# Patient Record
Sex: MALE | URBAN - NONMETROPOLITAN AREA
[De-identification: names, ages, dates, MRNs, and addresses within clinical notes are randomized per-mention and may not be internally consistent; named-entity substitution may affect disease eponyms.]

---

## 2023-05-08 LAB — HBA1C MFR BLD HPLC: 5.4 %

## 2023-11-30 ENCOUNTER — TRANSCRIBE ORDERS (OUTPATIENT)
Dept: CARDIOLOGY CLINIC | Facility: CLINIC | Age: 73
End: 2023-11-30

## 2023-11-30 DIAGNOSIS — R97.20 ELEVATED PROSTATE SPECIFIC ANTIGEN (PSA): Primary | ICD-10-CM

## 2023-12-14 ENCOUNTER — TELEPHONE (OUTPATIENT)
Dept: UROLOGY | Facility: CLINIC | Age: 73
End: 2023-12-14

## 2023-12-14 PROBLEM — R97.20 ELEVATED PSA: Status: ACTIVE | Noted: 2023-12-14

## 2023-12-14 NOTE — TELEPHONE ENCOUNTER
Fax sent to [facility] for Dr. Cintia Luna last office note. Error. Pt new pt.

## 2023-12-14 NOTE — PROGRESS NOTES
UROLOGY PROGRESS NOTE         NAME: Jong Ness  AGE: 73 y.o. SEX: male  : 1950   MRN: [id number]    DATE: 2023  TIME: 10:55 AM    Assessment and Plan   Procedures     Impression:   1. Elevated PSA         Plan: Discussed with the patient the options including observation and repeating the PSA in 6 months, prostate biopsy by transrectal ultrasound, or MP MRI.  Patient elects for MP MRI and I agree we will set that up at [facility] and then call patient with results.  All questions were answered to his satisfaction.      Chief Complaint   No chief complaint on file.    History of Present Illness     HPI: Jong Ness is a 73 y.o. year old male who presents with elevated PSA 6.8 on 2023.  Patient's previous PSA 6.2 on 5/10/2023.  Apparently I saw this patient in the past at [facility] urology and now the patient wishes for reevaluation with me at [facility] urology.  We will try to get my old office notes.      Currently patient without any irritable or obstructive voiding complaints.  Apparently has seen me years ago at [facility] for an elevated PSA but elected observation we have been unable to get report.  We have tried several times but [facility] has not been cooperative.      The following portions of the patient's history were reviewed and updated as appropriate: allergies, current medications, past family history, past medical history, past social history, past surgical history and problem list.  No past medical history on file.  No past surgical history on file.  shoulder  Review of Systems     Const: Denies chills, fever and weight loss.  CV: Denies chest pain.  Resp: Denies SOB.  GI: Denies abdominal pain, nausea and vomiting.  : Denies symptoms other than stated above.  Musculo: Denies back pain.    Objective   There were no vitals taken for this visit.    Physical Exam  Const: Appears healthy and well developed. No signs of acute distress present.  Resp: Respirations are regular and  unlabored.   CV: Rate is regular. Rhythm is regular.  Abdomen: Abdomen is soft, nontender, and nondistended. Kidneys are not palpable.  : BPH normal external genitalia exam  Psych: Patient's attitude is cooperative. Mood is normal. Affect is normal.    Current Medications   No current outpatient medications on file.        Karl Rodriguez MD

## 2023-12-19 ENCOUNTER — OFFICE VISIT (OUTPATIENT)
Dept: UROLOGY | Facility: CLINIC | Age: 73
End: 2023-12-19
Payer: COMMERCIAL

## 2023-12-19 VITALS
HEART RATE: 73 BPM | WEIGHT: 204 LBS | RESPIRATION RATE: 20 BRPM | OXYGEN SATURATION: 97 % | HEIGHT: 71 IN | DIASTOLIC BLOOD PRESSURE: 62 MMHG | SYSTOLIC BLOOD PRESSURE: 132 MMHG | TEMPERATURE: 96.4 F | BODY MASS INDEX: 28.56 KG/M2

## 2023-12-19 DIAGNOSIS — R97.20 ELEVATED PROSTATE SPECIFIC ANTIGEN (PSA): ICD-10-CM

## 2023-12-19 DIAGNOSIS — R97.20 ELEVATED PSA: Primary | ICD-10-CM

## 2023-12-19 PROCEDURE — 99214 OFFICE O/P EST MOD 30 MIN: CPT | Performed by: UROLOGY

## 2023-12-19 PROCEDURE — 99204 OFFICE O/P NEW MOD 45 MIN: CPT | Performed by: UROLOGY

## 2023-12-19 RX ORDER — ALBUTEROL SULFATE 90 UG/1
AEROSOL, METERED RESPIRATORY (INHALATION)
COMMUNITY

## 2024-01-08 ENCOUNTER — HOSPITAL ENCOUNTER (OUTPATIENT)
Dept: MRI IMAGING | Facility: HOSPITAL | Age: 74
Discharge: HOME | End: 2024-01-08
Attending: UROLOGY
Payer: COMMERCIAL

## 2024-01-08 ENCOUNTER — TELEPHONE (OUTPATIENT)
Dept: UROLOGY | Facility: CLINIC | Age: 74
End: 2024-01-08

## 2024-01-08 DIAGNOSIS — R97.20 ELEVATED PSA: ICD-10-CM

## 2024-01-08 PROCEDURE — A9585 GADOBUTROL INJECTION: HCPCS | Performed by: UROLOGY

## 2024-01-08 PROCEDURE — 76377 3D RENDER W/INTRP POSTPROCES: CPT

## 2024-01-08 PROCEDURE — 72197 MRI PELVIS W/O & W/DYE: CPT

## 2024-01-08 PROCEDURE — G1004 CDSM NDSC: HCPCS

## 2024-01-08 RX ORDER — GADOBUTROL 604.72 MG/ML
9 INJECTION INTRAVENOUS
Status: COMPLETED | OUTPATIENT
Start: 2024-01-08 | End: 2024-01-08

## 2024-01-08 RX ADMIN — GADOBUTROL 9 ML: 604.72 INJECTION INTRAVENOUS at 15:09

## 2024-01-08 NOTE — TELEPHONE ENCOUNTER
Pt was scheduled for virtual visit with Dr. Rodriguez on 1/15. Per Dr. Suarez VV not needed. Appt canceled pt aware. Pt had MRI today.

## 2024-01-12 ENCOUNTER — TELEPHONE (OUTPATIENT)
Dept: UROLOGY | Facility: CLINIC | Age: 74
End: 2024-01-12

## 2024-01-12 NOTE — TELEPHONE ENCOUNTER
----- Message from Karl Rodriguez MD sent at 1/12/2024  2:17 PM EST -----  Left voicemail for patient to call us back regarding the results of his MP MRI.  This is important because it is a positive score.  So if we do not hear from him by Tuesday of next week remind me to call him again thank you

## 2024-01-15 ENCOUNTER — TELEPHONE (OUTPATIENT)
Dept: UROLOGY | Facility: CLINIC | Age: 74
End: 2024-01-15

## 2024-01-15 PROBLEM — R93.89 ABNORMAL X-RAY: Status: ACTIVE | Noted: 2024-01-15

## 2024-01-15 RX ORDER — NYSTATIN 100000 U/G
CREAM TOPICAL
COMMUNITY
Start: 2023-11-30 | End: 2024-01-17 | Stop reason: ALTCHOICE

## 2024-01-15 NOTE — TELEPHONE ENCOUNTER
----- Message from Karl Rodriguez MD sent at 1/15/2024  7:54 AM EST -----  I have tried to reach this patient twice to talk about his MP MRI set him up for an office visit to discuss please either this week or next week thanks

## 2024-01-15 NOTE — PROGRESS NOTES
UROLOGY PROGRESS NOTE         NAME: Jong Ness  AGE: 73 y.o. SEX: male  : 1950   MRN: [id number]    DATE: 1/15/2024  TIME: 12:10 PM    Assessment and Plan   Procedures     Impression:   1. Elevated PSA    2. Abnormal x-ray         Plan: Plan MRI fusion biopsy transrectal ultrasound.  Risk of the procedure were explained to patient including bleeding infection he agrees.  He will get Cipro orally prior to the procedure and after.  He also do an enema the morning of.  Will get this set up the next available date at [facility].    All questions were answered to the patient.      Chief Complaint   No chief complaint on file.    History of Present Illness     HPI: Jong Ness is a 73 y.o. year old male who presents with follow-up to discuss MP MRI study.    Patient was last seen by me on 2023 for elevated PSA.  His PSA was 6.8 on 2023.  At that time patient was not having any irritable or obstructive voiding complaints.    Multiparametric MRI on 2024 showed a PI-RADS score of 4 in the right posterior medial peripheral zone measuring 1.4 cm suspicious for malignancy.  Prostate gland was calculated at 66 g.    Patient is here to discuss the options including observation, or fusion biopsy.    Had a long discussion with the patient and his wife regarding the MRI results and the size of the prostate and the recommendation for fusion biopsy he agrees.              The following portions of the patient's history were reviewed and updated as appropriate: allergies, current medications, past family history, past medical history, past social history, past surgical history and problem list.  No past medical history on file.  Past Surgical History:   Procedure Laterality Date    HEMORROIDECTOMY  2013     [facility] [place]  Review of Systems     Const: Denies chills, fever and weight loss.  CV: Denies chest pain.  Resp: Denies SOB.  GI: Denies abdominal pain, nausea and vomiting.  : Denies symptoms  other than stated above.  Musculo: Denies back pain.    Objective   There were no vitals taken for this visit.    Physical Exam  Const: Appears healthy and well developed. No signs of acute distress present.  Resp: Respirations are regular and unlabored.   CV: Rate is regular. Rhythm is regular.  Abdomen: Abdomen is soft, nontender, and nondistended. Kidneys are not palpable.  : dnp  Psych: Patient's attitude is cooperative. Mood is normal. Affect is normal.    Current Medications     Current Outpatient Medications:     nystatin (MYCOSTATIN) cream, APPLY TWICE A DAY TO RASH UNTIL CLEAR, Disp: , Rfl:     albuterol (PROVENTIL HFA,VENTOLIN HFA) 90 mcg/act inhaler, Inhalation for 17 (Patient not taking: Reported on 12/19/2023), Disp: , Rfl:         Karl Rodriguez MD

## 2024-01-17 ENCOUNTER — OFFICE VISIT (OUTPATIENT)
Dept: LAB | Facility: HOSPITAL | Age: 74
End: 2024-01-17
Payer: COMMERCIAL

## 2024-01-17 ENCOUNTER — APPOINTMENT (OUTPATIENT)
Dept: LAB | Facility: HOSPITAL | Age: 74
End: 2024-01-17
Payer: COMMERCIAL

## 2024-01-17 ENCOUNTER — OFFICE VISIT (OUTPATIENT)
Dept: UROLOGY | Facility: CLINIC | Age: 74
End: 2024-01-17
Payer: COMMERCIAL

## 2024-01-17 VITALS
OXYGEN SATURATION: 96 % | DIASTOLIC BLOOD PRESSURE: 90 MMHG | HEART RATE: 108 BPM | HEIGHT: 71 IN | SYSTOLIC BLOOD PRESSURE: 144 MMHG | TEMPERATURE: 97.5 F | BODY MASS INDEX: 28.5 KG/M2 | WEIGHT: 203.6 LBS

## 2024-01-17 DIAGNOSIS — R97.20 ELEVATED PSA: ICD-10-CM

## 2024-01-17 DIAGNOSIS — R35.0 URINE FREQUENCY: Primary | ICD-10-CM

## 2024-01-17 DIAGNOSIS — Z01.89 ENCOUNTER FOR URINE TEST: ICD-10-CM

## 2024-01-17 DIAGNOSIS — Z01.818 PREOP TESTING: ICD-10-CM

## 2024-01-17 DIAGNOSIS — R93.89 ABNORMAL X-RAY: ICD-10-CM

## 2024-01-17 LAB
ANION GAP SERPL CALCULATED.3IONS-SCNC: 4 MMOL/L
ATRIAL RATE: 64 BPM
BASOPHILS # BLD AUTO: 0.04 THOUSANDS/ÂΜL (ref 0–0.1)
BASOPHILS NFR BLD AUTO: 1 % (ref 0–1)
BUN SERPL-MCNC: 21 MG/DL (ref 5–25)
CALCIUM SERPL-MCNC: 9.8 MG/DL (ref 8.4–10.2)
CHLORIDE SERPL-SCNC: 109 MMOL/L (ref 96–108)
CO2 SERPL-SCNC: 29 MMOL/L (ref 21–32)
CREAT SERPL-MCNC: 1.05 MG/DL (ref 0.6–1.3)
EOSINOPHIL # BLD AUTO: 0.1 THOUSAND/ÂΜL (ref 0–0.61)
EOSINOPHIL NFR BLD AUTO: 2 % (ref 0–6)
ERYTHROCYTE [DISTWIDTH] IN BLOOD BY AUTOMATED COUNT: 12.6 % (ref 11.6–15.1)
GFR SERPL CREATININE-BSD FRML MDRD: 71 ML/MIN/1.73SQ M
GLUCOSE P FAST SERPL-MCNC: 108 MG/DL (ref 65–99)
HCT VFR BLD AUTO: 46.4 % (ref 36.5–49.3)
HGB BLD-MCNC: 15.3 G/DL (ref 12–17)
IMM GRANULOCYTES # BLD AUTO: 0.01 THOUSAND/UL (ref 0–0.2)
IMM GRANULOCYTES NFR BLD AUTO: 0 % (ref 0–2)
LYMPHOCYTES # BLD AUTO: 1.48 THOUSANDS/ÂΜL (ref 0.6–4.47)
LYMPHOCYTES NFR BLD AUTO: 32 % (ref 14–44)
MCH RBC QN AUTO: 31.4 PG (ref 26.8–34.3)
MCHC RBC AUTO-ENTMCNC: 33 G/DL (ref 31.4–37.4)
MCV RBC AUTO: 95 FL (ref 82–98)
MONOCYTES # BLD AUTO: 0.54 THOUSAND/ÂΜL (ref 0.17–1.22)
MONOCYTES NFR BLD AUTO: 12 % (ref 4–12)
NEUTROPHILS # BLD AUTO: 2.5 THOUSANDS/ÂΜL (ref 1.85–7.62)
NEUTS SEG NFR BLD AUTO: 53 % (ref 43–75)
NRBC BLD AUTO-RTO: 0 /100 WBCS
P AXIS: 45 DEGREES
PLATELET # BLD AUTO: 193 THOUSANDS/UL (ref 149–390)
PMV BLD AUTO: 10.2 FL (ref 8.9–12.7)
POTASSIUM SERPL-SCNC: 4.9 MMOL/L (ref 3.5–5.3)
PR INTERVAL: 176 MS
QRS AXIS: 29 DEGREES
QRSD INTERVAL: 88 MS
QT INTERVAL: 406 MS
QTC INTERVAL: 418 MS
RBC # BLD AUTO: 4.87 MILLION/UL (ref 3.88–5.62)
SODIUM SERPL-SCNC: 142 MMOL/L (ref 135–147)
T WAVE AXIS: -1 DEGREES
VENTRICULAR RATE: 64 BPM
WBC # BLD AUTO: 4.67 THOUSAND/UL (ref 4.31–10.16)

## 2024-01-17 PROCEDURE — 93010 ELECTROCARDIOGRAM REPORT: CPT | Performed by: INTERNAL MEDICINE

## 2024-01-17 PROCEDURE — 93005 ELECTROCARDIOGRAM TRACING: CPT

## 2024-01-17 PROCEDURE — 99214 OFFICE O/P EST MOD 30 MIN: CPT | Performed by: UROLOGY

## 2024-01-17 PROCEDURE — 80048 BASIC METABOLIC PNL TOTAL CA: CPT

## 2024-01-17 PROCEDURE — 36415 COLL VENOUS BLD VENIPUNCTURE: CPT

## 2024-01-17 PROCEDURE — 85025 COMPLETE CBC W/AUTO DIFF WBC: CPT

## 2024-01-17 PROCEDURE — 87086 URINE CULTURE/COLONY COUNT: CPT | Performed by: UROLOGY

## 2024-01-17 RX ORDER — CIPROFLOXACIN 500 MG/1
TABLET, FILM COATED ORAL
Qty: 4 TABLET | Refills: 0 | Status: SHIPPED | OUTPATIENT
Start: 2024-01-17 | End: 2024-01-21

## 2024-01-18 LAB — BACTERIA UR CULT: NORMAL

## 2024-01-26 NOTE — TELEPHONE ENCOUNTER
I spoke to the patient and scheduled his procedure for 2/8/2024 at [facility] with Dr. Rodriguez    -instructions given verbally and mailed  -patient aware to be NPO, needs a  and use an enema 1 hour prior to leaving the house morning of procedure  -All PAT testing complete

## 2024-01-26 NOTE — TELEPHONE ENCOUNTER
Pt calling to talk to ss about procedure and phone number for the ss was given to him     CBN: [phone]

## 2024-02-19 ENCOUNTER — TELEPHONE (OUTPATIENT)
Age: 74
End: 2024-02-19

## 2024-02-19 NOTE — TELEPHONE ENCOUNTER
Meg from 's office called stated that  would like to speak with  only regarding pt.      can be reached at [phone]   [No Acute Distress] : no acute distress [Well-Appearing] : well-appearing [Normal Sclera/Conjunctiva] : normal sclera/conjunctiva [PERRL] : pupils equal round and reactive to light [EOMI] : extraocular movements intact [Normal Outer Ear/Nose] : the outer ears and nose were normal in appearance [Normal Oropharynx] : the oropharynx was normal [No JVD] : no jugular venous distention [No Lymphadenopathy] : no lymphadenopathy [Normal Rate] : normal rate  [Clear to Auscultation] : lungs were clear to auscultation bilaterally [Regular Rhythm] : with a regular rhythm [Normal S1, S2] : normal S1 and S2 [No Murmur] : no murmur heard [No Edema] : there was no peripheral edema [Soft] : abdomen soft [Non Tender] : non-tender [Non-distended] : non-distended [Normal Bowel Sounds] : normal bowel sounds [Coordination Grossly Intact] : coordination grossly intact [No Focal Deficits] : no focal deficits [Normal Gait] : normal gait [de-identified] : No palpable nodules on palpation of the thyroid  [de-identified] : no wheezes, rales or rhonchi  [de-identified] : Palpable muscle tightness in the trapezius region. Spurling test negative, No tenderness on palpation of the spine, no drop offs or lateral rotation noted

## 2024-02-21 ENCOUNTER — TELEPHONE (OUTPATIENT)
Dept: CARDIOLOGY CLINIC | Facility: CLINIC | Age: 74
End: 2024-02-21

## 2024-02-29 NOTE — TELEPHONE ENCOUNTER
Per Meg from 's office patient will be seeing [facility] on 3/12/24 @ 8:30 am. Any other questions please call .